# Patient Record
Sex: MALE | Race: WHITE | URBAN - METROPOLITAN AREA
[De-identification: names, ages, dates, MRNs, and addresses within clinical notes are randomized per-mention and may not be internally consistent; named-entity substitution may affect disease eponyms.]

---

## 2022-07-18 ENCOUNTER — OFFICE VISIT (OUTPATIENT)
Dept: ORTHOPEDIC SURGERY | Age: 62
End: 2022-07-18
Payer: MEDICARE

## 2022-07-18 VITALS — HEIGHT: 67 IN | WEIGHT: 145 LBS | BODY MASS INDEX: 22.76 KG/M2 | RESPIRATION RATE: 18 BRPM

## 2022-07-18 DIAGNOSIS — M17.12 PRIMARY OSTEOARTHRITIS OF LEFT KNEE: Primary | ICD-10-CM

## 2022-07-18 PROCEDURE — 99203 OFFICE O/P NEW LOW 30 MIN: CPT | Performed by: ORTHOPAEDIC SURGERY

## 2022-07-18 PROCEDURE — 20610 DRAIN/INJ JOINT/BURSA W/O US: CPT | Performed by: ORTHOPAEDIC SURGERY

## 2022-07-18 RX ORDER — BUPIVACAINE HYDROCHLORIDE 2.5 MG/ML
2 INJECTION, SOLUTION INFILTRATION; PERINEURAL ONCE
Status: COMPLETED | OUTPATIENT
Start: 2022-07-18 | End: 2022-07-18

## 2022-07-18 RX ORDER — TRIAMCINOLONE ACETONIDE 40 MG/ML
40 INJECTION, SUSPENSION INTRA-ARTICULAR; INTRAMUSCULAR ONCE
Status: COMPLETED | OUTPATIENT
Start: 2022-07-18 | End: 2022-07-18

## 2022-07-18 RX ADMIN — BUPIVACAINE HYDROCHLORIDE 5 MG: 2.5 INJECTION, SOLUTION INFILTRATION; PERINEURAL at 15:22

## 2022-07-18 RX ADMIN — TRIAMCINOLONE ACETONIDE 40 MG: 40 INJECTION, SUSPENSION INTRA-ARTICULAR; INTRAMUSCULAR at 15:23

## 2022-09-20 ENCOUNTER — OFFICE VISIT (OUTPATIENT)
Dept: ORTHOPEDIC SURGERY | Age: 62
End: 2022-09-20
Payer: MEDICARE

## 2022-09-20 VITALS — BODY MASS INDEX: 22.76 KG/M2 | HEIGHT: 67 IN | WEIGHT: 145 LBS | RESPIRATION RATE: 18 BRPM

## 2022-09-20 DIAGNOSIS — G89.29 CHRONIC BILATERAL LOW BACK PAIN WITHOUT SCIATICA: Primary | ICD-10-CM
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
## 2022-09-20 DIAGNOSIS — M16.12 ARTHRITIS OF LEFT HIP: ICD-10-CM

## 2022-09-20 DIAGNOSIS — M54.50 CHRONIC BILATERAL LOW BACK PAIN WITHOUT SCIATICA: Primary | ICD-10-CM

## 2022-09-20 PROCEDURE — 99214 OFFICE O/P EST MOD 30 MIN: CPT | Performed by: PHYSICIAN ASSISTANT

## 2022-09-20 RX ORDER — HYDROCODONE BITARTRATE AND ACETAMINOPHEN 5; 325 MG/1; MG/1
1 TABLET ORAL EVERY 6 HOURS PRN
Qty: 28 TABLET | Refills: 0 | Status: SHIPPED | OUTPATIENT
Start: 2022-09-20 | End: 2022-09-20 | Stop reason: CLARIF

## 2022-09-20 RX ORDER — HYDROCODONE BITARTRATE AND ACETAMINOPHEN 5; 325 MG/1; MG/1
1 TABLET ORAL EVERY 6 HOURS PRN
Qty: 28 TABLET | Refills: 0 | Status: SHIPPED | OUTPATIENT
Start: 2022-09-20 | End: 2022-09-27

## 2022-09-20 NOTE — PROGRESS NOTES
History of present illness:   Mr. Maxcine Shone is a pleasant 62 y.o. male kindly referred by self for consultation regarding his mid and lower back pain. He reports symptoms beginning acutely after he slept on a wet asphalt surface injuring his low back. This fall occurred 1 month ago and has progressively worsened. He does occasionally have pain in the left groin. He had been seen in this office in the recent past for left knee arthritic pain. He states he underwent a left knee steroid injection and reports only mild temporary relief of left knee pain status post injection 7/18/2022. .    Back pain 7/10 VAS. Buttock pain 9/10 VAS. Left groin pain 8/10 VAS  Denies leg pain or radicular symptoms. Describes the pain as sharp pain. Pain is worse with forward flexion and extension. Denies numbness and tingling into the lower extremities. Denies weakness of his left or right leg. Denies bowel or bladder dysfunction and saddle anesthesia. Can sit for a maximum of 30 minutes and stand for a maximum 60 minutes. The pain does affect sleep. Prior medications and treatment have included Tylenol without relief. He does not tolerate NSAIDs due to GI upset. Past medical history:  His past medical history has been reviewed. Past Medical History:   Diagnosis Date    Arthritis     Depression     Enlarged prostate     Headache(784.0)     Manic depression ([address]     Other disorders of kidney and ureter     prostate       His past surgical history has been reviewed. Past Surgical History:   Procedure Laterality Date    MANDIBLE FRACTURE SURGERY      TONSILLECTOMY      UPPER GASTROINTESTINAL ENDOSCOPY           His medications and allergies were reviewed. Current Outpatient Medications   Medication Sig Dispense Refill    HYDROcodone-acetaminophen (NORCO) 5-325 MG per tablet Take 1 tablet by mouth every 6 hours as needed for Pain for up to 7 days. Take the least amount necessary to control pain. Do not operate machinery while taking this medication. Sedation Warning 28 tablet 0    acetaminophen (APAP EXTRA STRENGTH) 500 MG tablet Take 1 tablet by mouth every 6 hours as needed for Pain 28 tablet 0    doxepin (SINEQUAN) 150 MG capsule Take 1 capsule by mouth nightly. Indications: mood 30 capsule 0    cetirizine (ZYRTEC) 10 MG tablet Take 1 tablet by mouth daily. Indications: allergies 30 tablet 0    tamsulosin (FLOMAX) 0.4 MG capsule Take 1 capsule by mouth nightly. Indications: BPH 30 capsule 0     No current facility-administered medications for this visit. His social history has been reviewed. Social History     Occupational History    Not on file   Tobacco Use    Smoking status: Former     Types: Cigarettes     Quit date: 1993     Years since quittin.6    Smokeless tobacco: Never   Substance and Sexual Activity    Alcohol use: No     Comment: Patient hasn't drank in one year. 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 Family History   Problem Relation Age of Onset    Mental Illness Father     Substance Abuse Father     Substance Abuse Brother     High Blood Pressure Mother     High Cholesterol Mother     Mental Illness Mother          Review of Systems:  I have reviewed the clinically relevant past medical history, medications, allergies, family history, social history, and 13 point Review of Systems from the patient's recent history form & documented any details relevant to today's presenting complaints in the history above. The patient's self-reported past medical history, medications, allergies, family history, social history, and Review of Systems  as well as the spine form from today's date date have been scanned into the chart under the \"Media\" tab.       Patient's review of symptoms was reviewed and is significant for back pain and negative for recent weight loss, fatigue, chills, visual disturbances, blood in stool or urine, recent infection. Physical examination:  Mr. Armaan Velez most recent vitals:  Vitals  Resp: 18  Height: 5' 7\" (170.2 cm)  Weight: 145 lb (65.8 kg)  Body mass index is 22.71 kg/m². General exam:  He is well-developed and well-nourished, is in obvious discomfort and alert and oriented to person, place, and time. He demonstrates appropriate mood and affect. His skin is warm and dry. His gait is normal and he walks heel to toe without significant limp or instability. Back:  He stands with slight lumbar flexion. His lumbar flexion is limited. Extension and lateral bending are moderately reduced with pain. He has moderate  tenderness over his upper and lower lumbar spine without paraspinous muscle spasm. The skin over his lumbar spine is normal without a surgical scar. Lower extremities:  He has 5/5 motor strength of bilateral lower extremities. He has a negative straight leg raise on the left and right. Deep tendon reflexes:   Left patella 2+. Right patella 2+. Left achilles 2+. Right achilles 2+. Sensation is intact to light touch L3 to S1 bilaterally. He has no clonus. Hip range of motion is left hip range of motion is mildly diminished with pain. Stinchfield test is positive on the left. Negative Stinchfield on the right. Abdomen:  Non-tender and non-distended. No rash. Imaging:  X rays AP, lateral lumbar spine as well as AP pelvis obtained in the office today. X-rays show a questionable T12 vertebral compression deformity. Schmorl's nodes noted throughout the lumbar vertebral levels. He does have mild left hip arthritis. No acute fractures within the pelvis. Diagnosis:      ICD-10-CM    1.  Chronic bilateral low back pain without sciatica  M54.50 XR LUMBAR SPINE (2-3 VIEWS)    G89.29 XR PELVIS (1-2 VIEWS) MRI LUMBAR SPINE WO CONTRAST     HYDROcodone-acetaminophen (NORCO) 5-325 MG per tablet     DISCONTINUED: HYDROcodone-acetaminophen (NORCO) 5-325 MG per tablet      2. Fall, initial encounter  [address] XXX[address] MRI LUMBAR SPINE WO CONTRAST     HYDROcodone-acetaminophen (NORCO) 5-325 MG per tablet     DISCONTINUED: HYDROcodone-acetaminophen (NORCO) 5-325 MG per tablet      3. Arthritis of left hip  M16.12 HYDROcodone-acetaminophen (NORCO) 5-325 MG per tablet             Assessment/ Plan:    Back pain and left hip pain status post fall 1 month ago. X-rays concerning for possible T12 vertebral compression fracture. Possible occult compression fracture of the lumbar spine also concerning. X-ray showed left hip arthritis which may explain left groin pain. He does have a positive left hip exam.    I had an extensive discussion with Mr. Scott Watson and/or family regarding the natural history, etiology, and long term consequences of his condition. I have presented reasonable alternatives to the patient's proposed care, treatment, and services. Risks and benefits of the treatment options also reviewed in detail. I have outlined a treatment plan with them. He has had full opportunity to ask his questions. I have answered them all to his satisfaction. I feel that Mr. Scott Watson understands our discussion today. New Medications prescribed today-Norco for pain x1 week only. Controlled substances monitoring: possible medication side effects, risk of tolerance and/or dependence, and alternative treatments discussed, no signs of potential drug abuse or diversion identified, and OARRS report reviewed today- activity consistent with treatment plan. Further Imaging-obtain MRI lumbar spine to evaluate for lumbar vertebral compression fracture. Procedures-discussed possible left hip intra-articular injection in the future pending MRI results.       Follow up-after MRI to discuss results and further treatment

## 2022-09-21 ENCOUNTER — TELEPHONE (OUTPATIENT)
Dept: ORTHOPEDIC SURGERY | Age: 62
End: 2022-09-21

## 2022-09-21 NOTE — TELEPHONE ENCOUNTER
General Question     Subject: [facility] SAYS THEY DID NOT RECEIVE AN ORDER FOR AN MRI.   Patient: Gerardo Shona  Contact Number: [phone]

## 2022-09-27 ENCOUNTER — TELEPHONE (OUTPATIENT)
Dept: ORTHOPEDIC SURGERY | Age: 62
End: 2022-09-27

## 2022-09-27 DIAGNOSIS — M54.50 CHRONIC BILATERAL LOW BACK PAIN WITHOUT SCIATICA: Primary | ICD-10-CM

## 2022-09-27 DIAGNOSIS — G89.29 CHRONIC BILATERAL LOW BACK PAIN WITHOUT SCIATICA: Primary | ICD-10-CM
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
## 2022-09-27 RX ORDER — TRAMADOL HYDROCHLORIDE 50 MG/1
50 TABLET ORAL EVERY 6 HOURS PRN
Qty: 28 TABLET | Refills: 0 | Status: SHIPPED | OUTPATIENT
Start: 2022-09-27 | End: 2022-10-03

## 2022-09-27 NOTE — TELEPHONE ENCOUNTER
Rx Tramadol. Raymond caused GI upset. States he tolerates Tramadol in past.   Controlled substances monitoring: possible medication side effects, risk of tolerance and/or dependence, and alternative treatments discussed, no signs of potential drug abuse or diversion identified, and OARRS report reviewed today- activity consistent with treatment plan.     Follow up after mri

## 2022-09-27 NOTE — TELEPHONE ENCOUNTER
General Question     Subject: PATIENT STATES IS WAITING FOR A RETURN CALL REGARDING HIS MEDICATION. PLEASE ADVISE.    Patient Cameron Liz  Contact Number: [phone]

## 2022-09-27 NOTE — TELEPHONE ENCOUNTER
I called patient and informed him that it has only been 7 days. MRI is still pending with insurance. We will call him once it is approved. He is asking for pain medication. Percocet or Tramadol.   Please advise

## 2022-09-27 NOTE — TELEPHONE ENCOUNTER
General Question     Subject: MRI AUTHORIZATION  Patient and /or Facility Request: Dwaine Allenhurst  Contact Number: [phone]     Pt CALLING TO ASK IF HIS MRI AUTH HAS BEEN RCV'D FROM THE INSURANCE COMPANY? Pt STATES IT'S BEEN 2 WEEKS AND THERE SHOULD BE SOME WORD BACK. [facility] [employer] PLEASE CALL TO UPDATE THE Pt @ THE # ABOVE.

## 2022-09-30 ENCOUNTER — TELEPHONE (OUTPATIENT)
Dept: ORTHOPEDIC SURGERY | Age: 62
End: 2022-09-30

## 2022-09-30 NOTE — TELEPHONE ENCOUNTER
General Question     Subject: MRI TEST  Patient and /or Facility Request: Tangela Zaman  Contact Number: [phone]    PATIENT CALLED IN TO LET THE OFFICE KNOW THAT HE HAD AN SCHEDULED MRI TEST UPCOMING NEXT WEEK. .    IF ANY QUESTIONS FOR THE PATIENT CALL HIM BACK AT THE ABOVE NUMBER. Lilo Tipton

## 2022-10-03 ENCOUNTER — TELEPHONE (OUTPATIENT)
Dept: ORTHOPEDIC SURGERY | Age: 62
End: 2022-10-03
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
## 2022-10-03 DIAGNOSIS — G89.29 CHRONIC BILATERAL LOW BACK PAIN WITHOUT SCIATICA: ICD-10-CM

## 2022-10-03 DIAGNOSIS — M54.50 CHRONIC BILATERAL LOW BACK PAIN WITHOUT SCIATICA: ICD-10-CM

## 2022-10-03 RX ORDER — TRAMADOL HYDROCHLORIDE 50 MG/1
50 TABLET ORAL EVERY 6 HOURS PRN
Qty: 28 TABLET | Refills: 0 | Status: SHIPPED | OUTPATIENT
Start: 2022-10-03 | End: 2022-10-12

## 2022-10-03 NOTE — TELEPHONE ENCOUNTER
LM for patient that he does not need to come in for an appointment, Delmis Cosby will go over those over the phone.

## 2022-10-03 NOTE — TELEPHONE ENCOUNTER
Rx request has been e-prescribed. OK to have patient  Rx at Pharmacy    Controlled substances monitoring: possible medication side effects, risk of tolerance and/or dependence, and alternative treatments discussed, no signs of potential drug abuse or diversion identified, and OARRS report reviewed today- activity consistent with treatment plan.
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
## 2022-10-11 DIAGNOSIS — M54.50 CHRONIC BILATERAL LOW BACK PAIN WITHOUT SCIATICA: ICD-10-CM

## 2022-10-11 DIAGNOSIS — G89.29 CHRONIC BILATERAL LOW BACK PAIN WITHOUT SCIATICA: ICD-10-CM

## 2022-10-12 RX ORDER — TRAMADOL HYDROCHLORIDE 50 MG/1
50 TABLET ORAL EVERY 6 HOURS PRN
Qty: 28 TABLET | Refills: 0 | Status: SHIPPED | OUTPATIENT
Start: 2022-10-12 | End: 2022-10-19

## 2022-10-19 DIAGNOSIS — M54.50 CHRONIC BILATERAL LOW BACK PAIN WITHOUT SCIATICA: ICD-10-CM
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
## 2022-10-19 DIAGNOSIS — G89.29 CHRONIC BILATERAL LOW BACK PAIN WITHOUT SCIATICA: ICD-10-CM

## 2022-10-19 RX ORDER — TRAMADOL HYDROCHLORIDE 50 MG/1
50 TABLET ORAL EVERY 6 HOURS PRN
Qty: 28 TABLET | Refills: 0 | Status: SHIPPED | OUTPATIENT
Start: 2022-10-19 | End: 2022-10-26
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
## 2022-10-26 DIAGNOSIS — G89.29 CHRONIC BILATERAL LOW BACK PAIN WITHOUT SCIATICA: ICD-10-CM

## 2022-10-26 DIAGNOSIS — M54.50 CHRONIC BILATERAL LOW BACK PAIN WITHOUT SCIATICA: ICD-10-CM

## 2022-10-26 RX ORDER — TRAMADOL HYDROCHLORIDE 50 MG/1
50 TABLET ORAL EVERY 6 HOURS PRN
Qty: 28 TABLET | Refills: 0 | Status: SHIPPED | OUTPATIENT
Start: 2022-10-26 | End: 2022-11-02

## 2022-11-02 DIAGNOSIS — G89.29 CHRONIC BILATERAL LOW BACK PAIN WITHOUT SCIATICA: ICD-10-CM

## 2022-11-02 DIAGNOSIS — M54.50 CHRONIC BILATERAL LOW BACK PAIN WITHOUT SCIATICA: ICD-10-CM
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
## 2022-11-02 RX ORDER — TRAMADOL HYDROCHLORIDE 50 MG/1
50 TABLET ORAL EVERY 6 HOURS PRN
Qty: 28 TABLET | Refills: 0 | Status: SHIPPED | OUTPATIENT
Start: 2022-11-02 | End: 2022-11-10

## 2022-11-09 DIAGNOSIS — G89.29 CHRONIC BILATERAL LOW BACK PAIN WITHOUT SCIATICA: ICD-10-CM

## 2022-11-09 DIAGNOSIS — M54.50 CHRONIC BILATERAL LOW BACK PAIN WITHOUT SCIATICA: ICD-10-CM
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
## 2022-11-10 ENCOUNTER — TELEPHONE (OUTPATIENT)
Dept: ORTHOPEDIC SURGERY | Age: 62
End: 2022-11-10

## 2022-11-10 RX ORDER — TRAMADOL HYDROCHLORIDE 50 MG/1
50 TABLET ORAL EVERY 6 HOURS PRN
Qty: 28 TABLET | Refills: 0 | Status: SHIPPED | OUTPATIENT
Start: 2022-11-10 | End: 2022-11-17

## 2022-11-10 NOTE — TELEPHONE ENCOUNTER
Rx request has been e-prescribed. OK to have patient  Rx at Pharmacy    Controlled substances monitoring: possible medication side effects, risk of tolerance and/or dependence, and alternative treatments discussed and OARRS report reviewed today- activity consistent with treatment plan.

## 2022-11-10 NOTE — TELEPHONE ENCOUNTER
Prescription Refill     Medication Name:  [address]: [facility] [address]   [facility]. [phone]  Patient Contact Number:  [phone]

## 2022-11-15 ENCOUNTER — OFFICE VISIT (OUTPATIENT)
Dept: ORTHOPEDIC SURGERY | Age: 62
End: 2022-11-15
Payer: MEDICARE

## 2022-11-15 VITALS — WEIGHT: 145 LBS | BODY MASS INDEX: 22.76 KG/M2 | RESPIRATION RATE: 18 BRPM | HEIGHT: 67 IN
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
## 2022-11-15 DIAGNOSIS — M54.50 CHRONIC BILATERAL LOW BACK PAIN WITHOUT SCIATICA: Primary | ICD-10-CM

## 2022-11-15 DIAGNOSIS — G89.29 CHRONIC BILATERAL LOW BACK PAIN WITHOUT SCIATICA: Primary | ICD-10-CM

## 2022-11-15 PROCEDURE — 99213 OFFICE O/P EST LOW 20 MIN: CPT | Performed by: PHYSICIAN ASSISTANT

## 2022-11-16 NOTE — PROGRESS NOTES
Subjective:      Patient ID: Tyra Kim is a 62 y.o. male who is here for follow up evaluation of his low back pain, review MRI result. He states his low back pain is better controlled with tramadol. He is taking tramadol 3 times a day. He denies any new complaints or concerns today. Denies leg pain or radicular symptoms. Describes the pain as sharp pain. Pain is worse with forward flexion and extension. Denies numbness and tingling into the lower extremities. Denies weakness of his left or right leg. Denies bowel or bladder dysfunction and saddle anesthesia. Can sit for a maximum of 30 minutes and stand for a maximum 60 minutes. The pain does affect sleep. Review Of Systems:      Patient's review of symptoms was reviewed and is significant for back pain and negative for recent weight loss, fatigue, chills, visual disturbances, blood in stool or urine, recent infection. Past Medical History:   Diagnosis Date    Arthritis     Depression     Enlarged prostate     Headache(784.0)     Manic depression ([facility])     Other disorders of kidney and ureter     prostate       Family History   Problem Relation Age of Onset    Mental Illness Father     Substance Abuse Father     Substance Abuse Brother     High Blood Pressure Mother     High Cholesterol Mother     Mental Illness Mother        Past Surgical History:   Procedure Laterality Date    MANDIBLE FRACTURE SURGERY      TONSILLECTOMY      UPPER GASTROINTESTINAL ENDOSCOPY         Social History     Occupational History    Not on file   Tobacco Use    Smoking status: Former     Types: Cigarettes     Quit date: 1993     Years since quittin.8    Smokeless tobacco: Never   Substance and Sexual Activity    Alcohol use: No     Comment: Patient hasn't drank in one year.     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 28 tablet 0    acetaminophen (APAP EXTRA STRENGTH) 500 MG tablet Take 1 tablet by mouth every 6 hours as needed for Pain 28 tablet 0    doxepin (SINEQUAN) 150 MG capsule Take 1 capsule by mouth nightly. Indications: mood 30 capsule 0    cetirizine (ZYRTEC) 10 MG tablet Take 1 tablet by mouth daily. Indications: allergies 30 tablet 0    tamsulosin (FLOMAX) 0.4 MG capsule Take 1 capsule by mouth nightly. Indications: BPH 30 capsule 0     No current facility-administered medications for this visit. Objective:     He is alert, oriented x 3, pleasant, well nourished, developed and in no   acute distress. Resp 18   Ht 5' 7\" (1.702 m)   Wt 145 lb (65.8 kg)   BMI 22.71 kg/m²      Back:  He stands with slight lumbar flexion. His lumbar flexion is limited. Extension and lateral bending are mildly reduced with pain. He has mild  tenderness over his upper and lower lumbar spine without paraspinous muscle spasm. The skin over his lumbar spine is normal without a surgical scar. Lower extremities:  He has 5/5 motor strength of bilateral lower extremities. He has a negative straight leg raise on the left and right. Deep tendon reflexes:   Left patella 2+. Right patella 2+. Left achilles 2+. Right achilles 2+. Sensation is intact to light touch L3 to S1 bilaterally. He has no clonus. X Rays: not performed in the office today:     MRI: Obtained from [facility] or an outside facility. CONCLUSION:   1. Mild lower lumbar spondylosis with noncompressive discopathy and mild right-sided edematous    endplate changes at G6-M5.   2. Along the left posterior margin of the L5 vertebral body is a nonaggressive appearing T2    hyperintense/T1 hypointense lesion with a T2 hypointense center. Differential considerations    include atypical Schmorl's node. Correlate with lumbar spine CT. 3. No spinal nerve impingement or spinal canal stenosis. No compression fractures. Thank you for the opportunity to provide your interpretation. Diagnosis:       ICD-10-CM    1. Chronic bilateral low back pain without sciatica  M54.50 Ambulatory referral to Physical Therapy    G89.29       2. Fall, initial encounter  [address] XXX[address]            Assessment and Plan:       Assessment:  History of chronic low back pain which has worsened with recent fall. Pain under better control with the use of tramadol. He remains neurologically intact in both lower extremities. I had an extensive discussion with Mr. Bernardino Aguilar regarding the natural history, etiology, and long term consequences of his condition. I have presented reasonable alternatives to the patient's proposed care, treatment, and services. Risks and benefits of the treatment options also reviewed in detail. I have outlined a treatment plan with them. He has had full opportunity to ask his questions. I have answered them all to his satisfaction. I feel that Mr. Bernardino Aguilar understands our discussion today. Plan:  Medications-continue tramadol. We will begin to wean him down with next prescription to twice daily. Controlled substances monitoring: possible medication side effects, risk of tolerance and/or dependence, and alternative treatments discussed, no signs of potential drug abuse or diversion identified, and OARRS report reviewed today- activity consistent with treatment plan. PT-Rx for PT. Follow up- 4 weeks. Consider CT scan lumbar spine as recommended by MRI if pain still present in 4 weeks. Call or return to clinic if these symptoms worsen or fail to improve as anticipated.                                                   Rosalinda Crystal PA-C   Senior Physician Assistant   [facility] Disclaimer: This note was generated with use of a verbal recognition program (DRAGON) and an attempt was made to check for errors. It is possible that there are still dictated errors within this office note. If so, please bring any significant errors to my attention for an addendum. All efforts were made to ensure that this office note is accurate.
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
## 2022-11-17 DIAGNOSIS — M54.50 CHRONIC BILATERAL LOW BACK PAIN WITHOUT SCIATICA: ICD-10-CM

## 2022-11-17 DIAGNOSIS — G89.29 CHRONIC BILATERAL LOW BACK PAIN WITHOUT SCIATICA: ICD-10-CM

## 2022-11-17 RX ORDER — TRAMADOL HYDROCHLORIDE 50 MG/1
50 TABLET ORAL EVERY 8 HOURS PRN
Qty: 21 TABLET | Refills: 0 | Status: SHIPPED | OUTPATIENT
Start: 2022-11-17 | End: 2022-11-24
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
## 2023-10-19 ENCOUNTER — HOSPITAL ENCOUNTER (EMERGENCY)
Age: 63
Discharge: HOME | End: 2023-10-19
Payer: COMMERCIAL

## 2023-10-19 VITALS
BODY MASS INDEX: 22.76 KG/M2 | SYSTOLIC BLOOD PRESSURE: 140 MMHG | RESPIRATION RATE: 18 BRPM | DIASTOLIC BLOOD PRESSURE: 85 MMHG | TEMPERATURE: 99.4 F | WEIGHT: 145 LBS | HEART RATE: 88 BPM | HEIGHT: 67 IN | OXYGEN SATURATION: 100 %

## 2023-10-19 DIAGNOSIS — R09.81 CHRONIC NASAL CONGESTION: Primary | ICD-10-CM

## 2023-10-19 PROCEDURE — 99282 EMERGENCY DEPT VISIT SF MDM: CPT

## 2023-10-19 ASSESSMENT — PAIN SCALES - GENERAL: PAINLEVEL_OUTOF10: 0

## 2023-10-19 ASSESSMENT — PAIN - FUNCTIONAL ASSESSMENT: PAIN_FUNCTIONAL_ASSESSMENT: 0-10

## 2023-10-19 NOTE — ED NOTES
Patient discharged from ED with all personal belongings and ED paperwork. Patient verbalized understanding regarding ENT follow up. Patient requesting cab at this time stating he got here via city bus and does not have any family/friends to transport home.        Jesus Hurt RN  10/19/23 0647

## 2023-10-23 NOTE — ED PROVIDER NOTES
doctor, he says he been to the urgent care multiple times and they keep putting him on antibiotics and steroids and has not gotten any better. I had a long conversation the patient regards the fact that he needs to follow-up with a primary care doctor for his chronic issues. There is no indication of acute emergent medical condition, patient was very upset, he did ask for pain medication as well, not sure what the pain medication would be for but he was upset when I told him I could give him some Tylenol. Patient told me that this was \"bullshit\". Again he has no indication of acute emergent medical condition and given referral to follow-up with ENT for his chronic nasal congestion. He was discharged in good condition. I am the Primary Clinician of Record. FINAL IMPRESSION      1.  Chronic nasal congestion          DISPOSITION/PLAN     DISPOSITION Decision to Discharge    PATIENT REFERRED TO:  Flor Lew, [address] [place] [address]    Call   For follow up      DISCHARGE MEDICATIONS:  Discharge Medication List as of 10/19/2023  6:47 PM          DISCONTINUED MEDICATIONS:  Discharge Medication List as of 10/19/2023  6:47 PM        STOP taking these medications       doxazosin (CARDURA) 1 MG tablet Comments:   Reason for Stopping:         SAW PALMETTO Comments:   Reason for Stopping:                      (Please note that portions of this note were completed with a voice recognition program.  Efforts were made to edit the dictations but occasionally words are mis-transcribed.)    ABBIE Cohen CNP (electronically signed)       ABBIE Cohen CNP  10/23/23 9434

## 2023-10-27 ENCOUNTER — OFFICE VISIT (OUTPATIENT)
Dept: ENT CLINIC | Age: 63
End: 2023-10-27
Payer: MEDICARE

## 2023-10-27 VITALS
TEMPERATURE: 97.7 F | HEIGHT: 67 IN | SYSTOLIC BLOOD PRESSURE: 120 MMHG | RESPIRATION RATE: 16 BRPM | WEIGHT: 145 LBS | BODY MASS INDEX: 22.76 KG/M2 | DIASTOLIC BLOOD PRESSURE: 73 MMHG | HEART RATE: 63 BPM

## 2023-10-27 DIAGNOSIS — M95.0 NASAL VALVE COLLAPSE: ICD-10-CM

## 2023-10-27 DIAGNOSIS — R09.81 NASAL CONGESTION: ICD-10-CM

## 2023-10-27 DIAGNOSIS — R09.82 POST-NASAL DRAINAGE: Primary | ICD-10-CM

## 2023-10-27 PROCEDURE — 99204 OFFICE O/P NEW MOD 45 MIN: CPT | Performed by: OTOLARYNGOLOGY

## 2023-10-27 PROCEDURE — G8484 FLU IMMUNIZE NO ADMIN: HCPCS | Performed by: OTOLARYNGOLOGY

## 2023-10-27 PROCEDURE — G8427 DOCREV CUR MEDS BY ELIG CLIN: HCPCS | Performed by: OTOLARYNGOLOGY

## 2023-10-27 PROCEDURE — G8420 CALC BMI NORM PARAMETERS: HCPCS | Performed by: OTOLARYNGOLOGY

## 2023-10-27 PROCEDURE — 31231 NASAL ENDOSCOPY DX: CPT | Performed by: OTOLARYNGOLOGY

## 2023-10-27 PROCEDURE — 3017F COLORECTAL CA SCREEN DOC REV: CPT | Performed by: OTOLARYNGOLOGY

## 2023-10-27 PROCEDURE — 1036F TOBACCO NON-USER: CPT | Performed by: OTOLARYNGOLOGY

## 2023-10-27 RX ORDER — AZELASTINE 1 MG/ML
1 SPRAY, METERED NASAL 2 TIMES DAILY
Qty: 60 ML | Refills: 2 | Status: SHIPPED | OUTPATIENT
Start: 2023-10-27

## 2023-10-27 RX ORDER — CETIRIZINE HYDROCHLORIDE 10 MG/1
10 TABLET ORAL DAILY
Qty: 30 TABLET | Refills: 0 | Status: SHIPPED | OUTPATIENT
Start: 2023-10-27

## 2023-10-27 RX ORDER — FLUTICASONE PROPIONATE 50 MCG
1 SPRAY, SUSPENSION (ML) NASAL 2 TIMES DAILY
Qty: 16 G | Refills: 2 | Status: SHIPPED | OUTPATIENT
Start: 2023-10-27

## 2023-10-27 NOTE — PROGRESS NOTES
[facility], [address] [place] [address], [address] [place] [address],[address]  P: [phone]       Patient     Recardo Ramp  1960    ChiefComplaint     Chief Complaint   Patient presents with    New Patient     Patient is here to discuss nasal congestion and post nasal drip. He states that his symptoms are worse in the morning. History of Present Illness     Julianne Oreilly is a 65year old male here today for evaluation of nasal congestion and post nasal drainage. Ongoing for 1 year. Symptoms worse in the morning. Has been on 2 courses of antibiotics without improvement. History of nasal bone fracture without intervention. Has tried over the counter medications without improvement as well. Tried flonase but only for a short time. Requesting additional antibiotics. Past Medical History     Past Medical History:   Diagnosis Date    Arthritis     Depression     Enlarged prostate     Fracture of nasal bone     Headache(784.0)     Manic depression ([address])     Other disorders of kidney and ureter     prostate       Past Surgical History     Past Surgical History:   Procedure Laterality Date    MANDIBLE FRACTURE SURGERY      TONSILLECTOMY      UPPER GASTROINTESTINAL ENDOSCOPY         Family History     Family History   Problem Relation Age of Onset    Mental Illness Father     Substance Abuse Father     Substance Abuse Brother     High Blood Pressure Mother     High Cholesterol Mother     Mental Illness Mother        Social History     Social History     Tobacco Use    Smoking status: Former     Types: Cigarettes     Quit date: 1993     Years since quittin.7    Smokeless tobacco: Never   Substance Use Topics    Alcohol use: No     Comment: Patient hasn't drank in one year.     Drug use: No     Types: Marijuana Vinayak Picking)     Comment: pot  & benzos , in [place]  since        Allergies     Allergies   Allergen Reactions    Cephalexin     Codeine     Cogentin [Benztropine Mesylate]     Erythrocin

## 2023-10-30 ASSESSMENT — ENCOUNTER SYMPTOMS
EYE ITCHING: 0
APNEA: 0
COUGH: 0
TROUBLE SWALLOWING: 0
VOICE CHANGE: 0
SINUS PRESSURE: 0
FACIAL SWELLING: 0
SORE THROAT: 0
SHORTNESS OF BREATH: 0

## 2023-11-23 DIAGNOSIS — R09.82 POST-NASAL DRAINAGE: ICD-10-CM

## 2023-11-27 RX ORDER — CETIRIZINE HYDROCHLORIDE 10 MG/1
10 TABLET ORAL DAILY
Qty: 30 TABLET | Refills: 7 | Status: SHIPPED | OUTPATIENT
Start: 2023-11-27

## 2025-01-31 NOTE — PROGRESS NOTES
Please call patient.  His CT overall looks ok except for a mild sinus infection. This may be contributing to his headaches.  I will send in an antibiotic for him. He should call if this doesn't help.
interpreted today. There are mild tricompartmental degenerative changes of osteoarthritis with small peritubular arthritis in the medial patellofemoral compartments. Assessment:  Left knee osteoarthritis    Plan:  We discussed the diagnosis and treatment options. He is currently having symptomatic arthritis due to recent fall. He is unable to take most NSAIDs. I recommended and performed a left knee steroid injection today as described below. He will otherwise continue as tolerated and take Tylenol as needed for pain control. He will follow-up here as needed for possible repeat injection. PROCEDURE NOTE:  After verbal consent was obtained, the patient's left knee was prepped with alcohol. Skin was anesthetized with ethyl chloride. The knee was then injected under sterile technique with 2mL of 0.25% marcaine and 1 mL of 40 mg/mL Kenalog. A bandage was applied. The patient tolerated the procedure well and there were no complications. Total time spent on today's encounter was at least 31 minutes. This time included reviewing prior notes, radiographs, and lab results when available, reviewing history obtained by medical assistant, performing history and physical exam, reviewing tests/radiographs with the patient, counseling the patient, ordering medications or tests, documentation in the electronic health record, and coordination of care. This dictation was done with Dragon dictation and may contain mechanical errors related to translation.